# Patient Record
Sex: FEMALE | Race: WHITE | NOT HISPANIC OR LATINO | Employment: FULL TIME | URBAN - METROPOLITAN AREA
[De-identification: names, ages, dates, MRNs, and addresses within clinical notes are randomized per-mention and may not be internally consistent; named-entity substitution may affect disease eponyms.]

---

## 2024-01-24 PROBLEM — E55.9 VITAMIN D DEFICIENCY: Status: ACTIVE | Noted: 2023-09-14

## 2024-01-24 PROBLEM — K13.0 DISEASES OF LIPS: Status: ACTIVE | Noted: 2019-08-16

## 2024-01-24 PROBLEM — F32.A DEPRESSIVE DISORDER: Status: ACTIVE | Noted: 2023-09-14

## 2024-01-24 PROBLEM — M79.641 PAIN OF RIGHT HAND: Status: ACTIVE | Noted: 2024-01-24

## 2024-01-24 PROBLEM — R92.2 DENSE BREAST: Status: ACTIVE | Noted: 2023-03-01

## 2024-01-24 PROBLEM — R92.30 DENSE BREAST: Status: ACTIVE | Noted: 2023-03-01

## 2024-01-24 PROBLEM — Z98.890 HX OF COLONOSCOPY: Status: ACTIVE | Noted: 2017-03-07

## 2024-01-24 PROBLEM — E66.3 OVERWEIGHT (BMI 25.0-29.9): Status: ACTIVE | Noted: 2024-01-24

## 2024-01-24 RX ORDER — ZOSTER VACCINE RECOMBINANT, ADJUVANTED 50 MCG/0.5
KIT INTRAMUSCULAR
COMMUNITY
Start: 2023-01-26 | End: 2024-01-25 | Stop reason: WASHOUT

## 2024-01-24 RX ORDER — DOCUSATE SODIUM 100 MG/1
CAPSULE, LIQUID FILLED ORAL
COMMUNITY
Start: 2005-11-14 | End: 2024-01-25 | Stop reason: WASHOUT

## 2024-01-24 RX ORDER — KETOCONAZOLE 20 MG/G
CREAM TOPICAL
COMMUNITY
Start: 2019-08-16 | End: 2024-01-25 | Stop reason: WASHOUT

## 2024-01-24 RX ORDER — OXYCODONE AND ACETAMINOPHEN 10; 325 MG/1; MG/1
TABLET ORAL
COMMUNITY
Start: 2005-11-14 | End: 2024-01-25 | Stop reason: WASHOUT

## 2024-01-24 RX ORDER — ESTRADIOL/NORETHINDRONE ACETATE TRANSDERMAL SYSTEM .05; .14 MG/D; MG/D
PATCH, EXTENDED RELEASE TRANSDERMAL
COMMUNITY
Start: 2021-10-18

## 2024-01-24 RX ORDER — BUPROPION HYDROCHLORIDE 100 MG/1
100 TABLET ORAL
COMMUNITY
End: 2024-01-25 | Stop reason: WASHOUT

## 2024-01-25 ENCOUNTER — OFFICE VISIT (OUTPATIENT)
Dept: PRIMARY CARE | Facility: CLINIC | Age: 59
End: 2024-01-25
Payer: COMMERCIAL

## 2024-01-25 VITALS
BODY MASS INDEX: 27.32 KG/M2 | SYSTOLIC BLOOD PRESSURE: 114 MMHG | HEIGHT: 66 IN | DIASTOLIC BLOOD PRESSURE: 72 MMHG | TEMPERATURE: 97.9 F | HEART RATE: 108 BPM | WEIGHT: 170 LBS

## 2024-01-25 DIAGNOSIS — Z13.29 SCREENING FOR THYROID DISORDER: ICD-10-CM

## 2024-01-25 DIAGNOSIS — Z00.00 ROUTINE GENERAL MEDICAL EXAMINATION AT A HEALTH CARE FACILITY: Primary | ICD-10-CM

## 2024-01-25 DIAGNOSIS — E55.9 VITAMIN D DEFICIENCY: ICD-10-CM

## 2024-01-25 DIAGNOSIS — Z12.11 SCREENING FOR COLON CANCER: ICD-10-CM

## 2024-01-25 DIAGNOSIS — E78.5 HYPERLIPIDEMIA, UNSPECIFIED HYPERLIPIDEMIA TYPE: ICD-10-CM

## 2024-01-25 DIAGNOSIS — Z13.220 LIPID SCREENING: ICD-10-CM

## 2024-01-25 PROCEDURE — 1036F TOBACCO NON-USER: CPT | Performed by: FAMILY MEDICINE

## 2024-01-25 PROCEDURE — 99396 PREV VISIT EST AGE 40-64: CPT | Performed by: FAMILY MEDICINE

## 2024-01-25 ASSESSMENT — PATIENT HEALTH QUESTIONNAIRE - PHQ9
2. FEELING DOWN, DEPRESSED OR HOPELESS: NOT AT ALL
SUM OF ALL RESPONSES TO PHQ9 QUESTIONS 1 AND 2: 0
1. LITTLE INTEREST OR PLEASURE IN DOING THINGS: NOT AT ALL

## 2024-01-25 NOTE — PROGRESS NOTES
"    /72   Pulse 108   Temp 36.6 °C (97.9 °F)   Ht 1.664 m (5' 5.5\")   Wt 77.1 kg (170 lb)   BMI 27.86 kg/m²     Past Medical History:   Diagnosis Date    Encounter for examination of eyes and vision without abnormal findings     Encounter for vision screening    Encounter for screening for lipoid disorders     Screening for hyperlipidemia    Encounter for screening for other suspected endocrine disorder     Screening for hypothyroidism    Other specified soft tissue disorders     Foot swelling    Pain in left knee     Knee pain, left    Personal history of other specified conditions     History of paresthesia    Personal history of urinary (tract) infections     History of urinary tract infection    Raynaud's syndrome without gangrene     Raynaud phenomenon       Patient Active Problem List   Diagnosis    Dense breast    Depressive disorder    Diseases of lips    Hx of colonoscopy    Overweight (BMI 25.0-29.9)    Pain of right hand    Vitamin D deficiency       Current Outpatient Medications   Medication Sig Dispense Refill    estradiol-norethindrone acet (CombiPatch) 0.05-0.14 mg/24 hr APPLY 1 PATCH TWICE A WEEK EVERY MONDAY AND THURSDAY      buPROPion (Wellbutrin) 100 mg tablet Take 1 tablet (100 mg) by mouth once daily.      docusate sodium (Colace) 100 mg capsule Take by mouth.      ketoconazole (NIZOral) 2 % cream 1 Application      oxyCODONE-acetaminophen (Percocet)  mg tablet Take by mouth.      Shingrix, PF, 50 mcg/0.5 mL vaccine        No current facility-administered medications for this visit.       CC/HPI/ASSESSMENT/PLAN    CC annual wellness visit    HPI patient 58-year-old female here for wellness visit.  No cognitive deficits noted.  She patient up-to-date with mammography.  She like an order for blood work.  She is overdue for colonoscopy.  Patient notes once or twice a year she will experience increased heart rate that lasts for minutes to hours and then resolves.  She notes that " the heart rate is very fast and steady.  She feels fine when this happens.  We discussed the likelihood that she has SVT intermittently.  She does not smoke.  She drinks couple cups of coffee a day.  No decongestant use.  Patient exercises intermittently notes she has no chest pain or palpitations with exercise.  She denies fever chills shortness of breath abdominal pain diarrhea syncope.  ROS negative except note above past medical social history reviewed    Exam calm eyes no jaundice ears clear mouth moist throat clear neck supple no LAD goiter or carotid bruit.  Lungs CTA good AE.  CV RRR no murmur.  Abdomen soft nontender back straight no scoliosis Ext full ROM of all extremities no edema or cyanosis skin no rash neuro alert oriented CN II through XII intact no focal neurologic deficits noted.  No cognitive deficits noted    A/P 1.  Annual wellness visit.  Blood work ordered.  Colonoscopy ordered.  She is up-to-date with mammography.  She is up-to-date with vaccines.  No cognitive deficits noted.  I have asked her to monitor her heart rate if the heart rate elevates as described above I requested she either go to the ER or call the squad to potentially have an EKG done so that we can capture the rhythm.  If patient becomes symptomatic she definitely needs to go to the emergency department.  Patient will have blood work as discussed.  Follow-up 1 year sooner as needed.    There are no diagnoses linked to this encounter.

## 2024-02-21 ENCOUNTER — TELEPHONE (OUTPATIENT)
Dept: PRIMARY CARE | Facility: CLINIC | Age: 59
End: 2024-02-21
Payer: COMMERCIAL

## 2024-02-21 NOTE — TELEPHONE ENCOUNTER
Blood work reviewed.  Cholesterol slightly elevated but good cholesterol is very high which is good.  Cholesterol levels are good on.  Sugar liver kidney function all normal.  Blood counts are good.  Vitamin D level good.  Her thyroid level is borderline.  I would recommend we recheck a thyroid level in 6 months.  No medication at this time.  Recommend follow-up 6 months to order blood work.

## 2024-05-23 ENCOUNTER — ANESTHESIA EVENT (OUTPATIENT)
Dept: GASTROENTEROLOGY | Facility: EXTERNAL LOCATION | Age: 59
End: 2024-05-23

## 2024-06-06 ENCOUNTER — HOSPITAL ENCOUNTER (OUTPATIENT)
Dept: GASTROENTEROLOGY | Facility: EXTERNAL LOCATION | Age: 59
Discharge: HOME | End: 2024-06-06
Payer: COMMERCIAL

## 2024-06-06 ENCOUNTER — ANESTHESIA (OUTPATIENT)
Dept: GASTROENTEROLOGY | Facility: EXTERNAL LOCATION | Age: 59
End: 2024-06-06

## 2024-06-06 VITALS
TEMPERATURE: 97.5 F | DIASTOLIC BLOOD PRESSURE: 70 MMHG | RESPIRATION RATE: 16 BRPM | HEIGHT: 66 IN | OXYGEN SATURATION: 100 % | BODY MASS INDEX: 27 KG/M2 | WEIGHT: 168 LBS | SYSTOLIC BLOOD PRESSURE: 126 MMHG | HEART RATE: 98 BPM

## 2024-06-06 DIAGNOSIS — Z12.11 SCREENING FOR COLON CANCER: Primary | ICD-10-CM

## 2024-06-06 PROCEDURE — 45378 DIAGNOSTIC COLONOSCOPY: CPT | Performed by: INTERNAL MEDICINE

## 2024-06-06 RX ORDER — SODIUM CHLORIDE 9 MG/ML
20 INJECTION, SOLUTION INTRAVENOUS CONTINUOUS
Status: DISCONTINUED | OUTPATIENT
Start: 2024-06-06 | End: 2024-06-07 | Stop reason: HOSPADM

## 2024-06-06 RX ORDER — LIDOCAINE HYDROCHLORIDE 20 MG/ML
INJECTION, SOLUTION INFILTRATION; PERINEURAL AS NEEDED
Status: DISCONTINUED | OUTPATIENT
Start: 2024-06-06 | End: 2024-06-06

## 2024-06-06 RX ORDER — PROPOFOL 10 MG/ML
INJECTION, EMULSION INTRAVENOUS AS NEEDED
Status: DISCONTINUED | OUTPATIENT
Start: 2024-06-06 | End: 2024-06-06

## 2024-06-06 RX ADMIN — PROPOFOL 50 MG: 10 INJECTION, EMULSION INTRAVENOUS at 08:53

## 2024-06-06 RX ADMIN — PROPOFOL 50 MG: 10 INJECTION, EMULSION INTRAVENOUS at 08:49

## 2024-06-06 RX ADMIN — PROPOFOL 100 MG: 10 INJECTION, EMULSION INTRAVENOUS at 08:47

## 2024-06-06 RX ADMIN — LIDOCAINE HYDROCHLORIDE 2 ML: 20 INJECTION, SOLUTION INFILTRATION; PERINEURAL at 08:47

## 2024-06-06 RX ADMIN — SODIUM CHLORIDE: 9 INJECTION, SOLUTION INTRAVENOUS at 08:46

## 2024-06-06 RX ADMIN — PROPOFOL 50 MG: 10 INJECTION, EMULSION INTRAVENOUS at 09:02

## 2024-06-06 RX ADMIN — PROPOFOL 50 MG: 10 INJECTION, EMULSION INTRAVENOUS at 08:51

## 2024-06-06 RX ADMIN — PROPOFOL 50 MG: 10 INJECTION, EMULSION INTRAVENOUS at 08:59

## 2024-06-06 RX ADMIN — PROPOFOL 50 MG: 10 INJECTION, EMULSION INTRAVENOUS at 08:56

## 2024-06-06 SDOH — HEALTH STABILITY: MENTAL HEALTH: CURRENT SMOKER: 0

## 2024-06-06 ASSESSMENT — COLUMBIA-SUICIDE SEVERITY RATING SCALE - C-SSRS
2. HAVE YOU ACTUALLY HAD ANY THOUGHTS OF KILLING YOURSELF?: NO
6. HAVE YOU EVER DONE ANYTHING, STARTED TO DO ANYTHING, OR PREPARED TO DO ANYTHING TO END YOUR LIFE?: NO
1. IN THE PAST MONTH, HAVE YOU WISHED YOU WERE DEAD OR WISHED YOU COULD GO TO SLEEP AND NOT WAKE UP?: NO

## 2024-06-06 ASSESSMENT — PAIN - FUNCTIONAL ASSESSMENT
PAIN_FUNCTIONAL_ASSESSMENT: 0-10
PAIN_FUNCTIONAL_ASSESSMENT: 0-10

## 2024-06-06 ASSESSMENT — PAIN SCALES - GENERAL
PAINLEVEL_OUTOF10: 0 - NO PAIN
PAIN_LEVEL: 0

## 2024-06-06 NOTE — ANESTHESIA PREPROCEDURE EVALUATION
Patient: Yue Reina    Procedure Information       Date/Time: 06/06/24 0830    Scheduled providers: Vignesh Peterson MD    Procedure: COLONOSCOPY    Location: [facility]            Relevant Problems   Anesthesia (within normal limits)      Cardiac (within normal limits)      Pulmonary (within normal limits)      Neuro   (+) Depressive disorder      GI (within normal limits)      /Renal (within normal limits)      Liver (within normal limits)      Endocrine (within normal limits)      Hematology (within normal limits)      Musculoskeletal (within normal limits)      HEENT (within normal limits)      ID (within normal limits)      Skin (within normal limits)      GYN (within normal limits)       Clinical information reviewed:   Tobacco  Allergies  Meds   Med Hx  Surg Hx  OB Status  Fam Hx  Soc   Hx        NPO Detail:  NPO/Void Status  Date of Last Liquid: 06/06/24  Time of Last Liquid: 0400  Date of Last Solid: 06/05/24  Time of Last Solid: 0930  Last Intake Type: Clear fluids         Physical Exam    Airway  Mallampati: II  TM distance: >3 FB  Neck ROM: full     Cardiovascular - normal exam  Rhythm: regular  Rate: normal     Dental - normal exam     Pulmonary - normal exam  Breath sounds clear to auscultation     Abdominal            Anesthesia Plan    History of general anesthesia?: yes  History of complications of general anesthesia?: no    ASA 2     MAC     The patient is not a current smoker.    intravenous induction   Anesthetic plan and risks discussed with patient.    Plan discussed with CRNA.

## 2024-06-06 NOTE — DISCHARGE INSTRUCTIONS
Patient Instructions Post Procedure      The anesthetics, sedatives or narcotics which were given to you today will be acting in your body for the next 24 hours, so you might feel a little sleepy or groggy.  This feeling should slowly wear off. Carefully read and follow the instructions.     You received sedation today:  - Do not drive or operate any machinery or power tools of any kind.   - No alcoholic beverages today, not even beer or wine.  - Do not make any important decisions or sign any legal documents.  - No over the counter medications that contain alcohol or that may cause drowsiness.    While it is common to experience mild to moderate abdominal distention, gas, or belching after your procedure, if any of these symptoms occur following discharge from the GI Lab or within one week of having your procedure, call the [facility] to be advised whether a visit to your nearest Urgent Care or Emergency Department is indicated.  Take this paper with you if you go.   - If you develop an allergic reaction to the medications that were given during your procedure such as difficulty breathing, rash, hives, severe nausea, vomiting or lightheadedness.  - If you experience chest pain, shortness of breath, severe abdominal pain, fevers and chills.  -If you develop signs and symptoms of bleeding such as blood in your spit, if your stools turn black, tarry, or bloody  - If you have not urinated within 8 hours following your procedure.  - If your IV site becomes painful, red, inflamed, or looks infected.    If you received a biopsy/polypectomy/sphincterotomy the following instructions apply below:  __ Do not use Aspirin containing products, non-steroidal medications or anti-coagulants for one week following your procedure. (Examples of these types of medications are: Advil, Arthrotec, Aleve, Coumadin, Ecotrin, Heparin, Ibuprofen, Indocin, Motrin, Naprosyn, Nuprin, Plavix, Vioxx, and Voltarin, or their generic  forms.  This list is not all-inclusive.  Check with your physician or pharmacist before resuming medications.)   __ Eat a soft diet today.  Avoid foods that are poorly digested for the next 24 hours.  These foods would include: nuts, beans, lettuce, red meats, and fried foods. Start with liquids and advance your diet as tolerated, gradually work up to eating solids.   __ Do not have a Barium Study or Enema for one week.    Your physician recommends the additional following instructions:    -You have a contact number available for emergencies. The signs and symptoms of potential delayed complications were discussed with you. You may return to normal activities tomorrow.  -Resume your previous diet or other if specified.  -Continue your present medications.   -We are waiting for your pathology results, if applicable.  -The findings and recommendations have been discussed with you and/or family.  - Please see Medication Reconciliation Form for new medication/medications prescribed.     If you experience any problems or have any questions following discharge from the GI Lab, please call: [phone] from 7 am- 4:30 pm.  In the event of an emergency please go to the closest Emergency Department or call  [phone]

## 2024-06-06 NOTE — ANESTHESIA POSTPROCEDURE EVALUATION
Patient: Yue Reina    Procedure Summary       Date: 06/06/24 Room / Location: [facility]    Anesthesia Start: 0846 Anesthesia Stop: 0911    Procedure: COLONOSCOPY Diagnosis:       Screening for colon cancer      Screening for colon cancer    Scheduled Providers: Vignesh Peterson MD Responsible Provider: KAR Sunshine    Anesthesia Type: MAC ASA Status: 2            Anesthesia Type: MAC    Vitals Value Taken Time   /74 06/06/24 0911   Temp 36.3 06/06/24 0911   Pulse 92 06/06/24 0911   Resp 16 06/06/24 0911   SpO2 99 06/06/24 0911       Anesthesia Post Evaluation    Patient location during evaluation: bedside  Patient participation: complete - patient participated  Level of consciousness: awake  Pain score: 0  Pain management: adequate  Airway patency: patent  Cardiovascular status: acceptable  Respiratory status: acceptable  Hydration status: acceptable  Postoperative Nausea and Vomiting: none        There were no known notable events for this encounter.

## 2024-06-06 NOTE — PRE-SEDATION DOCUMENTATION
Patient: Yue Reina  MRN: [id number]    Pre-sedation Evaluation:  Sedation necessary for: Analgesia  Requesting service: gi    History of Present Illness: screening colonoscopy     Past Medical History:   Diagnosis Date    Encounter for examination of eyes and vision without abnormal findings     Encounter for vision screening    Encounter for screening for lipoid disorders     Screening for hyperlipidemia    Encounter for screening for other suspected endocrine disorder     Screening for hypothyroidism    Other specified soft tissue disorders     Foot swelling    Pain in left knee     Knee pain, left    Personal history of other specified conditions     History of paresthesia    Personal history of urinary (tract) infections     History of urinary tract infection    Raynaud's syndrome without gangrene     Raynaud phenomenon       Principle problems:  Patient Active Problem List    Diagnosis Date Noted    Routine general medical examination at a health care facility 01/25/2024    Overweight (BMI 25.0-29.9) 01/24/2024    Pain of right hand 01/24/2024    Depressive disorder 09/14/2023    Vitamin D deficiency 09/14/2023    Dense breast 03/01/2023    Diseases of lips 08/16/2019    Hx of colonoscopy 03/07/2017     Allergies:  Allergies   Allergen Reactions    Morphine Shortness of breath     Depression and SOB    Amoxicillin Other     PTA/Current Medications:  (Not in a hospital admission)    Current Outpatient Medications   Medication Sig Dispense Refill    estradiol-norethindrone acet (CombiPatch) 0.05-0.14 mg/24 hr APPLY 1 PATCH TWICE A WEEK EVERY MONDAY AND THURSDAY       No current facility-administered medications for this encounter.     Past Surgical History:   has a past surgical history that includes Other surgical history (02/05/2022).    Recent sedation/surgery (24 hours) No    Review of Systems:  Please check all that apply: No significant medical history    Pregnancy test completed prior to procedure on  any menstruating female: negative        NPO guidelines met: Yes    Physical Exam    Airway  Mallampati: I  TM distance: <3 FB  Neck ROM: full     Cardiovascular - normal exam     Dental - normal exam     Pulmonary - normal exam         Plan    ASA 2     Deep

## 2024-06-06 NOTE — PRE-SEDATION DOCUMENTATION
Patient: Yue Reina  MRN: [id number]    Pre-sedation Evaluation:  Sedation necessary for: Analgesia  Requesting service: gi    History of Present Illness: screening colon     Past Medical History:   Diagnosis Date    Encounter for examination of eyes and vision without abnormal findings     Encounter for vision screening    Encounter for screening for lipoid disorders     Screening for hyperlipidemia    Encounter for screening for other suspected endocrine disorder     Screening for hypothyroidism    Other specified soft tissue disorders     Foot swelling    Pain in left knee     Knee pain, left    Personal history of other specified conditions     History of paresthesia    Personal history of urinary (tract) infections     History of urinary tract infection    Raynaud's syndrome without gangrene     Raynaud phenomenon       Principle problems:  Patient Active Problem List    Diagnosis Date Noted    Routine general medical examination at a health care facility 01/25/2024    Overweight (BMI 25.0-29.9) 01/24/2024    Pain of right hand 01/24/2024    Depressive disorder 09/14/2023    Vitamin D deficiency 09/14/2023    Dense breast 03/01/2023    Diseases of lips 08/16/2019    Hx of colonoscopy 03/07/2017     Allergies:  Allergies   Allergen Reactions    Morphine Shortness of breath     Depression and SOB    Amoxicillin Other     PTA/Current Medications:  (Not in a hospital admission)    Current Outpatient Medications   Medication Sig Dispense Refill    estradiol-norethindrone acet (CombiPatch) 0.05-0.14 mg/24 hr APPLY 1 PATCH TWICE A WEEK EVERY MONDAY AND THURSDAY       No current facility-administered medications for this encounter.     Past Surgical History:   has a past surgical history that includes Other surgical history (02/05/2022).    Recent sedation/surgery (24 hours) No    Review of Systems:  Please check all that apply: No significant medical history    Pregnancy test completed prior to procedure on any  menstruating female: none        NPO guidelines met: Yes    Physical Exam / Plan

## 2024-08-05 ENCOUNTER — TELEPHONE (OUTPATIENT)
Dept: PRIMARY CARE | Facility: CLINIC | Age: 59
End: 2024-08-05

## 2024-08-05 ENCOUNTER — APPOINTMENT (OUTPATIENT)
Dept: PRIMARY CARE | Facility: CLINIC | Age: 59
End: 2024-08-05
Payer: COMMERCIAL

## 2024-08-05 VITALS
HEIGHT: 66 IN | TEMPERATURE: 97.9 F | WEIGHT: 171.2 LBS | HEART RATE: 97 BPM | BODY MASS INDEX: 27.51 KG/M2 | DIASTOLIC BLOOD PRESSURE: 84 MMHG | SYSTOLIC BLOOD PRESSURE: 146 MMHG

## 2024-08-05 DIAGNOSIS — R53.83 FATIGUE, UNSPECIFIED TYPE: Primary | ICD-10-CM

## 2024-08-05 DIAGNOSIS — R79.89 ABNORMAL TSH: ICD-10-CM

## 2024-08-05 PROBLEM — M79.643 PAIN OF HAND: Status: ACTIVE | Noted: 2024-01-24

## 2024-08-05 PROCEDURE — 99213 OFFICE O/P EST LOW 20 MIN: CPT | Performed by: FAMILY MEDICINE

## 2024-08-05 PROCEDURE — 3008F BODY MASS INDEX DOCD: CPT | Performed by: FAMILY MEDICINE

## 2024-08-05 PROCEDURE — 1036F TOBACCO NON-USER: CPT | Performed by: FAMILY MEDICINE

## 2024-08-05 ASSESSMENT — PATIENT HEALTH QUESTIONNAIRE - PHQ9
1. LITTLE INTEREST OR PLEASURE IN DOING THINGS: NOT AT ALL
2. FEELING DOWN, DEPRESSED OR HOPELESS: NOT AT ALL
SUM OF ALL RESPONSES TO PHQ9 QUESTIONS 1 AND 2: 0

## 2024-08-05 NOTE — PROGRESS NOTES
"    /84   Pulse 97   Temp 36.6 °C (97.9 °F)   Ht 1.664 m (5' 5.5\")   Wt 77.7 kg (171 lb 3.2 oz)   BMI 28.06 kg/m²     Past Medical History:   Diagnosis Date    Encounter for examination of eyes and vision without abnormal findings     Encounter for vision screening    Encounter for screening for lipoid disorders     Screening for hyperlipidemia    Encounter for screening for other suspected endocrine disorder     Screening for hypothyroidism    Other specified soft tissue disorders     Foot swelling    Pain in left knee     Knee pain, left    Personal history of other specified conditions     History of paresthesia    Personal history of urinary (tract) infections     History of urinary tract infection    Raynaud's syndrome without gangrene     Raynaud phenomenon       Patient Active Problem List   Diagnosis    Dense breast    Depressive disorder    Diseases of lips    Hx of colonoscopy    Overweight (BMI 25.0-29.9)    Pain of hand    Vitamin D deficiency    Routine general medical examination at a health care facility       Current Outpatient Medications   Medication Sig Dispense Refill    estradiol-norethindrone acet (CombiPatch) 0.05-0.14 mg/24 hr APPLY 1 PATCH TWICE A WEEK EVERY MONDAY AND THURSDAY       No current facility-administered medications for this visit.       CC/HPI/ASSESSMENT/PLAN    CC follow-up blood work    HPI patient blood work performed February that was reviewed.  Cholesterol mildly elevated but good cholesterol is high.  Thyroid level was borderline.  Liver kidney sugar levels look good.  We discussed rechecking blood work in 6 months to reevaluate thyroid level.  Patient like an order for blood work.  She did have blood work recently but only CBC was run.  Her blood counts are normal.  I will provide patient with blood test ordered to recheck her thyroid levels.  This can be a nonfasting test.  ROS negative some noted above past medical social surgical history is reviewed    Exam " calm eyes no jaundice neck supple lungs CTA CV RRR neuro alert oriented no focal neurologic deficit noted psych calm pleasant female no anxiety or depression noted    A/P 1.  Abnormal TSH.  Blood work is ordered.  Last thyroid level was borderline, we will recheck her thyroid levels with nonfasting blood work is ordered today.    There are no diagnoses linked to this encounter.

## 2024-08-16 ENCOUNTER — TELEPHONE (OUTPATIENT)
Dept: PRIMARY CARE | Facility: CLINIC | Age: 59
End: 2024-08-16
Payer: COMMERCIAL

## 2025-02-05 ENCOUNTER — TELEPHONE (OUTPATIENT)
Dept: PRIMARY CARE | Facility: CLINIC | Age: 60
End: 2025-02-05
Payer: COMMERCIAL

## 2025-08-08 ENCOUNTER — APPOINTMENT (OUTPATIENT)
Dept: PRIMARY CARE | Facility: CLINIC | Age: 60
End: 2025-08-08
Payer: COMMERCIAL

## 2025-09-11 ENCOUNTER — APPOINTMENT (OUTPATIENT)
Dept: PRIMARY CARE | Facility: CLINIC | Age: 60
End: 2025-09-11
Payer: COMMERCIAL

## 2025-09-19 ENCOUNTER — APPOINTMENT (OUTPATIENT)
Dept: PRIMARY CARE | Facility: CLINIC | Age: 60
End: 2025-09-19
Payer: COMMERCIAL